# Patient Record
Sex: FEMALE | Race: WHITE | NOT HISPANIC OR LATINO | Employment: FULL TIME | URBAN - METROPOLITAN AREA
[De-identification: names, ages, dates, MRNs, and addresses within clinical notes are randomized per-mention and may not be internally consistent; named-entity substitution may affect disease eponyms.]

---

## 2019-09-03 ENCOUNTER — TELEPHONE (OUTPATIENT)
Dept: FAMILY MEDICINE | Facility: CLINIC | Age: 24
End: 2019-09-03

## 2020-11-06 ENCOUNTER — OFFICE VISIT (OUTPATIENT)
Dept: FAMILY MEDICINE | Facility: CLINIC | Age: 25
End: 2020-11-06
Payer: COMMERCIAL

## 2020-11-06 VITALS
BODY MASS INDEX: 23.49 KG/M2 | DIASTOLIC BLOOD PRESSURE: 76 MMHG | TEMPERATURE: 98.7 F | HEIGHT: 65 IN | HEART RATE: 102 BPM | RESPIRATION RATE: 18 BRPM | SYSTOLIC BLOOD PRESSURE: 122 MMHG | WEIGHT: 141 LBS | OXYGEN SATURATION: 98 %

## 2020-11-06 DIAGNOSIS — G56.01 ACUTE CARPAL TUNNEL SYNDROME OF RIGHT WRIST: ICD-10-CM

## 2020-11-06 DIAGNOSIS — F11.11 HISTORY OF OPIOID ABUSE (CMS/HCC): ICD-10-CM

## 2020-11-06 DIAGNOSIS — Z30.011 ENCOUNTER FOR INITIAL PRESCRIPTION OF CONTRACEPTIVE PILLS: ICD-10-CM

## 2020-11-06 DIAGNOSIS — Z72.0 TOBACCO ABUSE: ICD-10-CM

## 2020-11-06 DIAGNOSIS — F41.1 GAD (GENERALIZED ANXIETY DISORDER): ICD-10-CM

## 2020-11-06 DIAGNOSIS — Z00.00 WELL WOMAN EXAM (NO GYNECOLOGICAL EXAM): Primary | ICD-10-CM

## 2020-11-06 PROCEDURE — 99385 PREV VISIT NEW AGE 18-39: CPT | Performed by: FAMILY MEDICINE

## 2020-11-06 RX ORDER — NORGESTIMATE AND ETHINYL ESTRADIOL 7DAYSX3 LO
1 KIT ORAL DAILY
Qty: 84 TABLET | Refills: 3 | Status: SHIPPED | OUTPATIENT
Start: 2020-11-06 | End: 2021-02-18 | Stop reason: SDUPTHER

## 2020-11-06 SDOH — HEALTH STABILITY: MENTAL HEALTH: HOW OFTEN DO YOU HAVE A DRINK CONTAINING ALCOHOL?: NEVER

## 2020-11-06 ASSESSMENT — ENCOUNTER SYMPTOMS
CONFUSION: 0
HALLUCINATIONS: 0
WEAKNESS: 0
FEVER: 0
RHINORRHEA: 0
JOINT SWELLING: 0
WOUND: 0
NAUSEA: 0
NERVOUS/ANXIOUS: 1
HEMATURIA: 0
EYE DISCHARGE: 0
TINGLING: 1
NUMBNESS: 1
COUGH: 0
CHILLS: 0
DYSURIA: 0
ABDOMINAL PAIN: 0
SHORTNESS OF BREATH: 1
VOMITING: 0

## 2020-11-06 ASSESSMENT — PATIENT HEALTH QUESTIONNAIRE - PHQ9: SUM OF ALL RESPONSES TO PHQ9 QUESTIONS 1 & 2: 2

## 2020-11-06 NOTE — PROGRESS NOTES
Subjective      Patient ID: Max Bonner is a 25 y.o. female.  1995      Max Bonner presents as new patient for routine physical, also would like to discuss anxiety and possible carpal tunnel, would like refill of birth control    Occupation:     [place] [facility] [facility] [place]  Dental care: past due/needs order  Optometry/ophthalmology: up to date    Mammogram: N/A  Menstrual cycle: regular, normal flow  Pap: up to date, last year with pregnancy    Diet: general  Exercise: none  Sleep: No complaints  Mood: increased anxiety, see below    PHQ 2 to 9:  Will the patinet answer the depression questions?: Y  Over the past 2 weeks, how often have you been bothered by feeling little interest or pleasure in doing things?: Several days  Over the past 2 weeks, how often have you been bothered by feeling down, depressed, or hopeless?: Several days  Depression Risk: 2              Anxiety  Presents for initial visit. Onset was 1 to 5 years ago. The problem has been gradually worsening. Symptoms include chest pain, excessive worry, nervous/anxious behavior and shortness of breath. Patient reports no confusion or nausea. Symptoms occur constantly. The severity of symptoms is interfering with daily activities and causing significant distress. The quality of sleep is fair. Nighttime awakenings: occasional.     Risk factors include illicit drug use. Her past medical history is significant for anxiety/panic attacks. Past treatments include nothing.   Wrist Pain   The pain is present in the right hand. This is a new problem. The current episode started more than 1 month ago. There has been no history of extremity trauma. The problem occurs daily. The problem has been gradually worsening. The quality of the pain is described as aching. The pain is moderate. Associated symptoms include numbness and tingling. Pertinent negatives include no fever. The symptoms are aggravated by activity. She  "has tried nothing for the symptoms.       The following have been reviewed and updated as appropriate in this visit:  Tobacco  Allergies  Meds  Problems  Med Hx  Surg Hx  Fam Hx       Review of Systems   Constitutional: Negative for chills and fever.   HENT: Negative for congestion and rhinorrhea.    Eyes: Negative for discharge and visual disturbance.   Respiratory: Positive for shortness of breath. Negative for cough.    Cardiovascular: Positive for chest pain. Negative for leg swelling.   Gastrointestinal: Negative for abdominal pain, nausea and vomiting.   Genitourinary: Negative for dysuria and hematuria.   Musculoskeletal: Negative for gait problem and joint swelling.   Skin: Negative for rash and wound.   Neurological: Positive for tingling and numbness. Negative for weakness.   Psychiatric/Behavioral: Negative for confusion and hallucinations. The patient is nervous/anxious.    All other systems reviewed and are negative.      Objective     Vitals:    11/06/20 0806   BP: 122/76   BP Location: Left upper arm   Patient Position: Sitting   Pulse: (!) 102   Resp: 18   Temp: 37.1 °C (98.7 °F)   SpO2: 98%   Weight: 64 kg (141 lb)   Height: 1.651 m (5' 5\")     Body mass index is 23.46 kg/m².    Physical Exam  Vitals signs and nursing note reviewed.   Constitutional:       Appearance: She is well-developed.   HENT:      Head: Normocephalic and atraumatic.   Eyes:      Conjunctiva/sclera: Conjunctivae normal.   Neck:      Musculoskeletal: Normal range of motion and neck supple.   Cardiovascular:      Rate and Rhythm: Normal rate and regular rhythm.      Heart sounds: Normal heart sounds.   Pulmonary:      Effort: Pulmonary effort is normal.      Breath sounds: Normal breath sounds.   Abdominal:      General: Bowel sounds are normal.      Palpations: Abdomen is soft.   Musculoskeletal: Normal range of motion.      Right wrist: She exhibits tenderness. She exhibits no bony tenderness, no swelling, no effusion " and no deformity.      Comments: Positive phalen, reverse phalen, and tinel   Skin:     General: Skin is warm and dry.      Capillary Refill: Capillary refill takes less than 2 seconds.   Neurological:      Mental Status: She is alert and oriented to person, place, and time.   Psychiatric:         Behavior: Behavior normal.         Assessment/Plan   Diagnoses and all orders for this visit:    Well woman exam (no gynecological exam) (Primary)  Comments:  patient declines flu shot, no routine labs indicated. pap last year, no history of abn    Encounter for initial prescription of contraceptive pills  Comments:  counseled patient, discussed pelvic prn concern for sti  Orders:  -     norgestimate-ethinyl estradioL (ORTHO TRI-CYCLEN LO, 28,) 0.18/0.215/0.25 mg-25 mcg per tablet; Take 1 tablet by mouth daily.    Acute carpal tunnel syndrome of right wrist  Comments:  refer to orhto, discussed bracing and nsaids  Orders:  -     Ambulatory referral to Orthopedic Surgery; Future    KENNEDY (generalized anxiety disorder)  Comments:  gave brochures for Psychology and counseling [facility] and [employer]. agree medical marijuana may be helpful.    History of opioid abuse (CMS/[facility])  Comments:  illicit percocet with history of OD. doing well.     Tobacco abuse  Comments:  recommend cessation

## 2020-11-06 NOTE — PATIENT INSTRUCTIONS
Patient Education     Carpal Tunnel Syndrome    Carpal tunnel syndrome is a condition that causes pain in your hand and arm. The carpal tunnel is a narrow area located on the palm side of your wrist. Repeated wrist motion or certain diseases may cause swelling within the tunnel. This swelling pinches the main nerve in the wrist (median nerve).  What are the causes?  This condition may be caused by:  · Repeated wrist motions.  · Wrist injuries.  · Arthritis.  · A cyst or tumor in the carpal tunnel.  · Fluid buildup during pregnancy.  Sometimes the cause of this condition is not known.  What increases the risk?  The following factors may make you more likely to develop this condition:  · Having a job, such as being a  or a , that requires you to repeatedly move your wrist in the same motion.  · Being a woman.  · Having certain conditions, such as:  ? Diabetes.  ? Obesity.  ? An underactive thyroid (hypothyroidism).  ? Kidney failure.  What are the signs or symptoms?  Symptoms of this condition include:  · A tingling feeling in your fingers, especially in your thumb, index, and middle fingers.  · Tingling or numbness in your hand.  · An aching feeling in your entire arm, especially when your wrist and elbow are bent for a long time.  · Wrist pain that goes up your arm to your shoulder.  · Pain that goes down into your palm or fingers.  · A weak feeling in your hands. You may have trouble grabbing and holding items.  Your symptoms may feel worse during the night.  How is this diagnosed?  This condition is diagnosed with a medical history and physical exam. You may also have tests, including:  · Electromyogram (EMG). This test measures electrical signals sent by your nerves into the muscles.  · Nerve conduction study. This test measures how well electrical signals pass through your nerves.  · Imaging tests, such as X-rays, ultrasound, and MRI. These tests check for possible causes of your condition.  How is  this treated?  This condition may be treated with:  · Lifestyle changes. It is important to stop or change the activity that caused your condition.  · Doing exercise and activities to strengthen your muscles and bones (physical therapy).  · Learning how to use your hand again after diagnosis (occupational therapy).  · Medicines for pain and inflammation. This may include medicine that is injected into your wrist.  · A wrist splint.  · Surgery.  Follow these instructions at home:  If you have a splint:  · Wear the splint as told by your health care provider. Remove it only as told by your health care provider.  · Loosen the splint if your fingers tingle, become numb, or turn cold and blue.  · Keep the splint clean.  · If the splint is not waterproof:  ? Do not let it get wet.  ? Cover it with a watertight covering when you take a bath or shower.  Managing pain, stiffness, and swelling    · If directed, put ice on the painful area:  ? If you have a removable splint, remove it as told by your health care provider.  ? Put ice in a plastic bag.  ? Place a towel between your skin and the bag.  ? Leave the ice on for 20 minutes, 2-3 times per day.  General instructions  · Take over-the-counter and prescription medicines only as told by your health care provider.  · Rest your wrist from any activity that may be causing your pain. If your condition is work related, talk with your employer about changes that can be made, such as getting a wrist pad to use while typing.  · Do any exercises as told by your health care provider, physical therapist, or occupational therapist.  · Keep all follow-up visits as told by your health care provider. This is important.  Contact a health care provider if:  · You have new symptoms.  · Your pain is not controlled with medicines.  · Your symptoms get worse.  Get help right away if:  · You have severe numbness or tingling in your wrist or hand.  Summary  · Carpal tunnel syndrome is a condition  that causes pain in your hand and arm.  · It is usually caused by repeated wrist motions.  · Lifestyle changes and medicines are used to treat carpal tunnel syndrome. Surgery may be recommended.  · Follow your health care provider's instructions about wearing a splint, resting from activity, keeping follow-up visits, and calling for help.  This information is not intended to replace advice given to you by your health care provider. Make sure you discuss any questions you have with your health care provider.  Document Released: 12/15/2001 Document Revised: 04/26/2019 Document Reviewed: 04/26/2019  Elsevier Patient Education © 2020 [employer]     Patient Education     Oral Contraception Use  Oral contraceptive pills (OCPs) are medicines that you take to prevent pregnancy. OCPs work by:  · Preventing the ovaries from releasing eggs.  · Thickening mucus in the lower part of the uterus (cervix), which prevents sperm from entering the uterus.  · Thinning the lining of the uterus (endometrium), which prevents a fertilized egg from attaching to the endometrium.  OCPs are highly effective when taken exactly as prescribed. However, OCPs do not prevent sexually transmitted infections (STIs). Safe sex practices, such as using condoms while on an OCP, can help prevent STIs.  Before taking OCPs, you may have a physical exam, blood test, and Pap test. A Pap test involves taking a sample of cells from your cervix to check for cancer. Discuss with your health care provider the possible side effects of the OCP you may be prescribed. When you start an OCP, be aware that it can take 2-3 months for your body to adjust to changes in hormone levels.  How to take oral contraceptive pills  Follow instructions from your health care provider about how to start taking your first cycle of OCPs. Your health care provider may recommend that you:  · Start the pill on day 1 of your menstrual period. If you start at this time, you will not need  any backup form of birth control (contraception), such as condoms.  · Start the pill on the first Sunday after your menstrual period or on the day you get your prescription. In these cases, you will need to use backup contraception for the first week.  · Start the pill at any time of your cycle.  ? If you take the pill within 5 days of the start of your period, you will not need a backup form of contraception.  ? If you start at any other time of your menstrual cycle, you will need to use another form of contraception for 7 days. If your OCP is the type called a minipill, it will protect you from pregnancy after taking it for 2 days (48 hours), and you can stop using backup contraception after that time.  After you have started taking OCPs:  · If you forget to take 1 pill, take it as soon as you remember. Take the next pill at the regular time.  · If you miss 2 or more pills, call your health care provider. Different pills have different instructions for missed doses. Use backup birth control until your next menstrual period starts.  · If you use a 28-day pack that contains inactive pills and you miss 1 of the last 7 pills (pills with no hormones), throw away the rest of the non-hormone pills and start a new pill pack.  No matter which day you start the OCP, you will always start a new pack on that same day of the week. Have an extra pack of OCPs and a backup contraceptive method available in case you miss some pills or lose your OCP pack.  Follow these instructions at home:  · Do not use any products that contain nicotine or tobacco, such as cigarettes and e-cigarettes. If you need help quitting, ask your health care provider.  · Always use a condom to protect against STIs. OCPs do not protect against STIs.  · Use a calendar to nam[place] the days of your menstrual period.  · Read the information and directions that came with your OCP. Talk to your health care provider if you have questions.  Contact a health care  provider if:  · You develop nausea and vomiting.  · You have abnormal vaginal discharge or bleeding.  · You develop a rash.  · You miss your menstrual period. Depending on the type of OCP you are taking, this may be a sign of pregnancy. Ask your health care provider for more information.  · You are losing your hair.  · You need treatment for mood swings or depression.  · You get dizzy when taking the OCP.  · You develop acne after taking the OCP.  · You become pregnant or think you may be pregnant.  · You have diarrhea, constipation, and abdominal pain or cramps.  · You miss 2 or more pills.  Get help right away if:  · You develop chest pain.  · You develop shortness of breath.  · You have an uncontrolled or severe headache.  · You develop numbness or slurred speech.  · You develop visual or speech problems.  · You develop pain, redness, and swelling in your legs.  · You develop weakness or numbness in your arms or legs.  Summary  · Oral contraceptive pills (OCPs) are medicines that you take to prevent pregnancy.  · OCPs do not prevent sexually transmitted infections (STIs). Always use a condom to protect against STIs.  · When you start an OCP, be aware that it can take 2-3 months for your body to adjust to changes in hormone levels.  · Read all the information and directions that come with your OCP.  This information is not intended to replace advice given to you by your health care provider. Make sure you discuss any questions you have with your health care provider.  Document Released: 12/06/2012 Document Revised: 04/10/2020 Document Reviewed: 01/29/2018  [employer] Patient Education © 2020 [employer]

## 2021-02-10 ENCOUNTER — OFFICE VISIT (OUTPATIENT)
Dept: FAMILY MEDICINE | Facility: CLINIC | Age: 26
End: 2021-02-10
Payer: COMMERCIAL

## 2021-02-10 VITALS
TEMPERATURE: 98.6 F | HEIGHT: 65 IN | HEART RATE: 112 BPM | BODY MASS INDEX: 24.49 KG/M2 | DIASTOLIC BLOOD PRESSURE: 62 MMHG | WEIGHT: 147 LBS | OXYGEN SATURATION: 99 % | SYSTOLIC BLOOD PRESSURE: 108 MMHG

## 2021-02-10 DIAGNOSIS — F11.11 HISTORY OF OPIOID ABUSE (CMS/HCC): Primary | ICD-10-CM

## 2021-02-10 PROCEDURE — 99213 OFFICE O/P EST LOW 20 MIN: CPT | Performed by: FAMILY MEDICINE

## 2021-02-10 RX ORDER — BUPRENORPHINE AND NALOXONE 12; 3 MG/1; MG/1
FILM, SOLUBLE BUCCAL; SUBLINGUAL DAILY
COMMUNITY

## 2021-02-10 RX ORDER — PROPRANOLOL HYDROCHLORIDE 10 MG/1
10 TABLET ORAL 3 TIMES DAILY
COMMUNITY

## 2021-02-10 NOTE — PROGRESS NOTES
"  Subjective      Patient ID: Max Bonner is a 26 y.o. female.  1995      Max Bonner presents    Went for intake for IOP, on MAT with suboxone, had positive urine yesterday with Fentanyl. States she did not use fentanyl. Typically snorts opiates, denies ever using IV      The following have been reviewed and updated as appropriate in this visit:  Allergies  Meds  Problems       Review of Systems   Constitutional: Negative for chills and fever.   HENT: Negative for congestion and rhinorrhea.    Eyes: Negative for discharge and visual disturbance.   Respiratory: Negative for cough and shortness of breath.    Cardiovascular: Negative for chest pain and leg swelling.   Gastrointestinal: Negative for abdominal pain, nausea and vomiting.   Genitourinary: Negative for dysuria and hematuria.   Musculoskeletal: Negative for gait problem and joint swelling.   Skin: Negative for rash and wound.   Neurological: Negative for weakness and numbness.   Psychiatric/Behavioral: Negative for confusion and hallucinations.   All other systems reviewed and are negative.      Objective     Vitals:    02/10/21 0847   BP: 108/62   BP Location: Left upper arm   Patient Position: Sitting   Pulse: (!) 112   Temp: 37 °C (98.6 °F)   TempSrc: Oral   SpO2: 99%   Weight: 66.7 kg (147 lb)   Height: 1.651 m (5' 5\")     Body mass index is 24.46 kg/m².    Physical Exam  Vitals signs and nursing note reviewed.   Constitutional:       Appearance: She is well-developed.   HENT:      Head: Normocephalic and atraumatic.   Eyes:      Conjunctiva/sclera: Conjunctivae normal.   Neck:      Musculoskeletal: Normal range of motion and neck supple.   Cardiovascular:      Rate and Rhythm: Normal rate and regular rhythm.      Heart sounds: Normal heart sounds.   Pulmonary:      Effort: Pulmonary effort is normal.      Breath sounds: Normal breath sounds.   Abdominal:      General: Bowel sounds are normal.      Palpations: Abdomen is soft. "   Musculoskeletal: Normal range of motion.   Skin:     General: Skin is warm and dry.      Capillary Refill: Capillary refill takes less than 2 seconds.   Neurological:      Mental Status: She is alert and oriented to person, place, and time.   Psychiatric:         Behavior: Behavior normal.         Assessment/Plan   Diagnoses and all orders for this visit:    History of opioid abuse (CMS/HCC) (Primary)  -     Basic metabolic panel; Future  -     Hepatic function panel; Future  -     CBC and differential; Future  -     TSH; Future      Return if symptoms worsen or fail to improve.    Rosa Hemphill, DO

## 2021-02-10 NOTE — PATIENT INSTRUCTIONS
Patient Education     Opioid Withdrawal Treatment  Opioid withdrawal is a group of symptoms that can happen if you have been taking opioids and then stop taking them. Opioid withdrawal can also happen:  · When the dosage is decreased.  · After stopping a prescription opioid as directed. Physical dependence can develop after taking opioids regularly for just a few days.  · After taking a prescription opioid incorrectly, such as taking more than recommended or taking it for a different purpose (opioid misuse).  · Taking the opioid illegally and then stopping.  Opioid withdrawal is not usually life-threatening, but it can be very uncomfortable and severe. Opioid withdrawal treatment makes managing withdrawal easier.  What are the signs and symptoms of opioid withdrawal?  Symptoms of this condition can be both physical and emotional. Physical symptoms include:  · Nausea and vomiting.  · Muscle aches or spasms.  · Watery eyes and runny nose.  · Widening of the dark centers of the eyes (dilated pupils).  · Hair standing on end.  · Fever and sweating.  · Flushing and itching of the skin.  · Stomach cramping and diarrhea.  · Trouble sleeping.  Emotional symptoms include:  · Depression.  · Anxiety and nervousness.  · Restlessness, irritability, and severe mood swings.  When symptoms start and how long they last depend on the kind of opioid you have been taking.  · Short-acting opioids, such as heroin or oxycodone, work fast and then lose their effect quickly. Symptoms occur within hours of stopping or reducing the amount you take. The worst symptoms (peak withdrawal) occur in 2-3 days. Overall, symptoms should lessen in 7-10 days.  · Long-acting opioids, such as methadone, work for a longer period of time. Symptoms can occur within 1-3 days of stopping or reducing the amount you take. The worst symptoms occur in 3-8 days. Symptoms may continue for several weeks but will be milder.  There are also medicines that block the  effects of opioids (opioid antagonists), such as naloxone. Naloxone is given to restore a person's breathing if it has slowed down or stopped due to an opioid overdose. The effect includes withdrawal symptoms that begin within minutes and last for about an hour. It is important to get medical help in this situation.  How is this treated?  Treatment for opioid withdrawal usually happens:  · In an emergency department.  · In a clinic or drug treatment facility.  · At home.  · In a combination of different settings.  Treatment for this condition depends on the type of opioids that are causing your symptoms.  Medicines  · Opioid or opioid-like medicine. This is the most effective treatment. It is used to block withdrawal symptoms, and then the dose is lowered over time.  · If you have been taking prescription opioid medicine, your health care provider may lower the dose over several days.  · If you have opioid use disorder, your health care provider may give you the following medicines to block withdrawals:  ? Methadone. It is given at a certain dose. The dose is then slowly lowered over 6-10 days.  ? Buprenorphine. It is given at a certain dose. The dose is then lowered over 3-5 days. In some cases, it may be lowered very slowly over 30 days.  · Other medicines may also be used as a stand-alone treatment or in combination with other treatments. These include:  ? Alpha-2 adrenergic agonists. These medicines block a brain chemical (norepinephrine) that causes some withdrawal symptoms. They can reduce and lessen the severity of symptoms but do not stop them. The medicines can cause side effects such as drowsiness, dizziness, and low blood pressure.  ? Other medicines to reduce anxiety, relieve muscle aches, promote sleep, and decrease digestive symptoms like nausea and diarrhea.  Other treatments    · Counseling. This treatment is also called talk therapy. It is provided by treatment counselors and is used in addition to  medicines.  · Support groups. These groups provide emotional support, advice, and guidance during recovery. They offer a safe environment in which you can talk to present and past opioid users and those who have gone through treatment.  Follow these instructions at home:  · Always check with your health care provider before starting any new medicines. Many of the medicines used to treat opioid withdrawal can have dangerous side effects and should only be taken as prescribed by your health care provider.  · Do not start using an opioid medicine again without talking to a health care provider. You may be at an increased risk for problems, including opioid overdose.  · If you have chronic pain, ask your health care provider about an intensive pain rehabilitation program or a referral to a chronic pain specialist. You may need to work with a pain specialist to come up with a treatment plan to help manage pain.  · Keep all follow-up visits as told by your health care provider. This is important.  Contact a health care provider if:  · You need help stopping use of an opioid medicine.  · You have been on long-term opioids and are planning to stop, and you would like to prepare for any withdrawal symptoms.  · You have been treated at home, but you are still having symptoms of withdrawal.  · You have been treated for withdrawal, but you have started using opioids again (relapsed).  Get help right away if you:  · Have chest pain and shortness of breath.  · Are drowsy and have difficulty staying awake.  · Feel weak or dizzy, or feel like you are going to pass out.  · Have nausea or vomiting that does not go away, or you begin to vomit blood.  · Have diarrhea that does not stop, or you begin to feel weak and light-headed.  · Feel severely depressed and anxious.  · Feel you may be a harm to yourself or others.  These symptoms may represent a serious problem that is an emergency. Do not wait to see if the symptoms will go away.  Get medical help right away. Call your local emergency services (911 in the [place] Do not drive yourself to the hospital.  Summary  · Opioid withdrawal is a group of symptoms that can happen if you have been taking opioids and then stop taking them.  · Opioid withdrawal symptoms are treated using medicines, counseling, and support groups.  · The most effective treatment is to use an opioid or opioid-like drug to block withdrawal symptoms and gradually lower the dose over time.  This information is not intended to replace advice given to you by your health care provider. Make sure you discuss any questions you have with your health care provider.  Document Released: 04/04/2019 Document Revised: 04/09/2020 Document Reviewed: 04/04/2019  [employer] Patient Education © 2020 [employer]

## 2021-02-11 LAB
ALBUMIN SERPL-MCNC: 4.3 G/DL (ref 3.6–5.1)
ALBUMIN/GLOB SERPL: 1.6 (CALC) (ref 1–2.5)
ALP SERPL-CCNC: 95 U/L (ref 31–125)
ALT SERPL-CCNC: 21 U/L (ref 6–29)
AST SERPL-CCNC: 18 U/L (ref 10–30)
BASOPHILS # BLD AUTO: 32 CELLS/UL (ref 0–200)
BASOPHILS NFR BLD AUTO: 0.3 %
BILIRUB DIRECT SERPL-MCNC: 0.1 MG/DL
BILIRUB INDIRECT SERPL-MCNC: 0.4 MG/DL (CALC) (ref 0.2–1.2)
BILIRUB SERPL-MCNC: 0.5 MG/DL (ref 0.2–1.2)
BUN SERPL-MCNC: 14 MG/DL (ref 7–25)
BUN/CREAT SERPL: NORMAL (CALC) (ref 6–22)
CALCIUM SERPL-MCNC: 9.8 MG/DL (ref 8.6–10.2)
CHLORIDE SERPL-SCNC: 104 MMOL/L (ref 98–110)
CO2 SERPL-SCNC: 25 MMOL/L (ref 20–32)
CREAT SERPL-MCNC: 0.65 MG/DL (ref 0.5–1.1)
EOSINOPHIL # BLD AUTO: 96 CELLS/UL (ref 15–500)
EOSINOPHIL NFR BLD AUTO: 0.9 %
ERYTHROCYTE [DISTWIDTH] IN BLOOD BY AUTOMATED COUNT: 13.1 % (ref 11–15)
GLOBULIN SER CALC-MCNC: 2.7 G/DL (CALC) (ref 1.9–3.7)
GLUCOSE SERPL-MCNC: 107 MG/DL (ref 65–139)
HCT VFR BLD AUTO: 37 % (ref 35–45)
HGB BLD-MCNC: 12.4 G/DL (ref 11.7–15.5)
LYMPHOCYTES # BLD AUTO: 2547 CELLS/UL (ref 850–3900)
LYMPHOCYTES NFR BLD AUTO: 23.8 %
MCH RBC QN AUTO: 29.5 PG (ref 27–33)
MCHC RBC AUTO-ENTMCNC: 33.5 G/DL (ref 32–36)
MCV RBC AUTO: 88.1 FL (ref 80–100)
MONOCYTES # BLD AUTO: 610 CELLS/UL (ref 200–950)
MONOCYTES NFR BLD AUTO: 5.7 %
NEUTROPHILS # BLD AUTO: 7415 CELLS/UL (ref 1500–7800)
NEUTROPHILS NFR BLD AUTO: 69.3 %
PLATELET # BLD AUTO: 448 THOUSAND/UL (ref 140–400)
PMV BLD REES-ECKER: 10.2 FL (ref 7.5–12.5)
POTASSIUM SERPL-SCNC: 3.9 MMOL/L (ref 3.5–5.3)
PROT SERPL-MCNC: 7 G/DL (ref 6.1–8.1)
QUEST EGFR NON-AFR. AMERICAN: 123 ML/MIN/1.73M2
RBC # BLD AUTO: 4.2 MILLION/UL (ref 3.8–5.1)
SODIUM SERPL-SCNC: 140 MMOL/L (ref 135–146)
TSH SERPL-ACNC: 1.43 MIU/L
WBC # BLD AUTO: 10.7 THOUSAND/UL (ref 3.8–10.8)

## 2021-02-11 ASSESSMENT — ENCOUNTER SYMPTOMS
ABDOMINAL PAIN: 0
EYE DISCHARGE: 0
HALLUCINATIONS: 0
CHILLS: 0
NUMBNESS: 0
HEMATURIA: 0
CONFUSION: 0
DYSURIA: 0
RHINORRHEA: 0
SHORTNESS OF BREATH: 0
COUGH: 0
WEAKNESS: 0
NAUSEA: 0
FEVER: 0
WOUND: 0
VOMITING: 0
JOINT SWELLING: 0

## 2021-02-12 LAB
FENTANYL UR-MCNC: NEGATIVE NG/ML
NORFENTANYL UR-MCNC: 1.8 NG/ML

## 2021-02-15 ENCOUNTER — TELEPHONE (OUTPATIENT)
Dept: FAMILY MEDICINE | Facility: CLINIC | Age: 26
End: 2021-02-15

## 2021-02-15 NOTE — TELEPHONE ENCOUNTER
Spoke with patient. She just scheduled for follow up RX of suboxone through a program through [facility] in [place]. She states she no longer needs RX

## 2021-02-15 NOTE — TELEPHONE ENCOUNTER
Pt called with questions about labs and RX script that she will be out of shortly.    Pt states last time she had Fentanyl was 1/4/2021. She had questions about the metabolites of Fentanyl and why this may be coming up in her urine?     Pt said it was discussed that her Suboxone RX almost completed and about getting refill so no abrupt stop. Pt on 8mg in am 4mg pm

## 2021-02-15 NOTE — TELEPHONE ENCOUNTER
Unfortunately I cannot explain the fentanyl test as it is way outside my area of expertise. In regards to the suboxone, I have very limited prescribing authority. I need an actual verification of the rx, either as a photo or copy of the script or verified from pharmacy she may have filled it at. I cannot refill without verification, and even then, only for very limited supply.    What is her current plan and backup plan for continuing her MAT?

## 2021-02-18 DIAGNOSIS — Z30.011 ENCOUNTER FOR INITIAL PRESCRIPTION OF CONTRACEPTIVE PILLS: ICD-10-CM

## 2021-02-18 RX ORDER — NORGESTIMATE AND ETHINYL ESTRADIOL 7DAYSX3 LO
1 KIT ORAL DAILY
Qty: 84 TABLET | Refills: 3 | Status: SHIPPED | OUTPATIENT
Start: 2021-02-18 | End: 2022-04-08

## 2021-02-18 NOTE — TELEPHONE ENCOUNTER
Medicine Refill Request    Last Office Visit: 2/10/2021  Last Telemedicine Visit: Visit date not found    Next Office Visit: Visit date not found  Next Telemedicine Visit: Visit date not found         Current Outpatient Medications:   •  buprenorphine-naloxone (SUBOXONE) 12-3 mg film, Place under the tongue daily., Disp: , Rfl:   •  norgestimate-ethinyl estradioL (ORTHO TRI-CYCLEN LO, 28,) 0.18/0.215/0.25 mg-25 mcg per tablet, Take 1 tablet by mouth daily., Disp: 84 tablet, Rfl: 3  •  propranoloL (INDERAL) 10 mg tablet, Take 10 mg by mouth 3 (three) times a day., Disp: , Rfl:       BP Readings from Last 3 Encounters:   02/10/21 108/62   11/06/20 122/76       Recent Lab results:  No results found for: CHOL, No results found for: HDL, No results found for: LDLCALC, No results found for: TRIG     Lab Results   Component Value Date    GLUCOSE 107 02/10/2021   , No results found for: HGBA1C      Lab Results   Component Value Date    CREATININE 0.65 02/10/2021       Lab Results   Component Value Date    TSH 1.43 02/10/2021

## 2021-04-16 RX ORDER — PROPRANOLOL HYDROCHLORIDE 40 MG/1
TABLET ORAL
Qty: 30 TABLET | Refills: 1 | OUTPATIENT
Start: 2021-04-16